# Patient Record
Sex: FEMALE | Race: WHITE | Employment: FULL TIME | URBAN - METROPOLITAN AREA
[De-identification: names, ages, dates, MRNs, and addresses within clinical notes are randomized per-mention and may not be internally consistent; named-entity substitution may affect disease eponyms.]

---

## 2017-06-08 ENCOUNTER — OFFICE VISIT (OUTPATIENT)
Dept: OBGYN CLINIC | Age: 52
End: 2017-06-08
Payer: COMMERCIAL

## 2017-06-08 VITALS
HEIGHT: 66 IN | BODY MASS INDEX: 38.31 KG/M2 | DIASTOLIC BLOOD PRESSURE: 64 MMHG | SYSTOLIC BLOOD PRESSURE: 108 MMHG | WEIGHT: 238.4 LBS

## 2017-06-08 DIAGNOSIS — Z12.39 BREAST CANCER SCREENING: Primary | ICD-10-CM

## 2017-06-08 PROCEDURE — 99396 PREV VISIT EST AGE 40-64: CPT | Performed by: NURSE PRACTITIONER

## 2017-06-08 RX ORDER — LANOLIN ALCOHOL/MO/W.PET/CERES
1000 CREAM (GRAM) TOPICAL DAILY
COMMUNITY
End: 2017-12-27

## 2017-06-08 RX ORDER — GLUCOSAMINE/CHONDR SU A SOD 750-600 MG
TABLET ORAL
COMMUNITY
End: 2017-12-27

## 2017-06-08 RX ORDER — MULTIVIT-MIN/IRON/FOLIC ACID/K 18-600-40
CAPSULE ORAL
COMMUNITY
End: 2020-05-20

## 2017-06-08 ASSESSMENT — ENCOUNTER SYMPTOMS
BACK PAIN: 0
DIARRHEA: 0
CONSTIPATION: 0
ABDOMINAL PAIN: 0
SHORTNESS OF BREATH: 0
COUGH: 0
ABDOMINAL DISTENTION: 0

## 2017-06-28 DIAGNOSIS — Z12.39 BREAST CANCER SCREENING: ICD-10-CM

## 2017-12-27 ENCOUNTER — HOSPITAL ENCOUNTER (OUTPATIENT)
Age: 52
Setting detail: SPECIMEN
Discharge: HOME | End: 2017-12-27
Payer: COMMERCIAL

## 2017-12-27 ENCOUNTER — OFFICE VISIT (OUTPATIENT)
Dept: OBGYN CLINIC | Age: 52
End: 2017-12-27
Payer: COMMERCIAL

## 2017-12-27 VITALS
HEIGHT: 66 IN | DIASTOLIC BLOOD PRESSURE: 84 MMHG | WEIGHT: 240 LBS | BODY MASS INDEX: 38.57 KG/M2 | SYSTOLIC BLOOD PRESSURE: 130 MMHG

## 2017-12-27 DIAGNOSIS — N89.8 VAGINAL DISCHARGE: ICD-10-CM

## 2017-12-27 DIAGNOSIS — N30.00 ACUTE CYSTITIS WITHOUT HEMATURIA: ICD-10-CM

## 2017-12-27 DIAGNOSIS — N30.00 ACUTE CYSTITIS WITHOUT HEMATURIA: Primary | ICD-10-CM

## 2017-12-27 LAB
BILIRUBIN URINE: NEGATIVE
COLOR: YELLOW
COMMENT UA: NORMAL
DIRECT EXAM: NORMAL
GLUCOSE URINE: NEGATIVE
KETONES, URINE: NEGATIVE
LEUKOCYTE ESTERASE, URINE: NEGATIVE
Lab: NORMAL
NITRITE, URINE: NEGATIVE
PH UA: 7.5 (ref 5–8)
PROTEIN UA: NEGATIVE
SPECIFIC GRAVITY UA: 1.01 (ref 1–1.03)
SPECIMEN DESCRIPTION: NORMAL
STATUS: NORMAL
TURBIDITY: CLEAR
URINE HGB: NEGATIVE
UROBILINOGEN, URINE: NORMAL

## 2017-12-27 PROCEDURE — G8427 DOCREV CUR MEDS BY ELIG CLIN: HCPCS | Performed by: OBSTETRICS & GYNECOLOGY

## 2017-12-27 PROCEDURE — 99213 OFFICE O/P EST LOW 20 MIN: CPT | Performed by: OBSTETRICS & GYNECOLOGY

## 2017-12-27 PROCEDURE — 3017F COLORECTAL CA SCREEN DOC REV: CPT | Performed by: OBSTETRICS & GYNECOLOGY

## 2017-12-27 PROCEDURE — G8484 FLU IMMUNIZE NO ADMIN: HCPCS | Performed by: OBSTETRICS & GYNECOLOGY

## 2017-12-27 PROCEDURE — 1036F TOBACCO NON-USER: CPT | Performed by: OBSTETRICS & GYNECOLOGY

## 2017-12-27 PROCEDURE — G8417 CALC BMI ABV UP PARAM F/U: HCPCS | Performed by: OBSTETRICS & GYNECOLOGY

## 2017-12-28 ENCOUNTER — TELEPHONE (OUTPATIENT)
Dept: OBGYN CLINIC | Age: 52
End: 2017-12-28

## 2017-12-28 NOTE — TELEPHONE ENCOUNTER
patient calling for results of U/A and vaginal probe from yesterday . Advised her all tests were negative.  She wonders if Dr Sarahi Orellana has any recommendations as \" she feels different down there\" \" tight\"     Patient number [phone]

## 2017-12-28 NOTE — TELEPHONE ENCOUNTER
I can only suggest that she stopped using the sent pad she's been using and try a lubricant such as Replens

## 2018-02-19 ENCOUNTER — OFFICE VISIT (OUTPATIENT)
Dept: OBGYN CLINIC | Age: 53
End: 2018-02-19
Payer: COMMERCIAL

## 2018-02-19 VITALS
DIASTOLIC BLOOD PRESSURE: 86 MMHG | HEIGHT: 66 IN | WEIGHT: 239.6 LBS | BODY MASS INDEX: 38.51 KG/M2 | SYSTOLIC BLOOD PRESSURE: 122 MMHG

## 2018-02-19 DIAGNOSIS — N94.10 DYSPAREUNIA, FEMALE: Primary | ICD-10-CM

## 2018-02-19 PROCEDURE — 1036F TOBACCO NON-USER: CPT | Performed by: NURSE PRACTITIONER

## 2018-02-19 PROCEDURE — G8427 DOCREV CUR MEDS BY ELIG CLIN: HCPCS | Performed by: NURSE PRACTITIONER

## 2018-02-19 PROCEDURE — G8484 FLU IMMUNIZE NO ADMIN: HCPCS | Performed by: NURSE PRACTITIONER

## 2018-02-19 PROCEDURE — 99213 OFFICE O/P EST LOW 20 MIN: CPT | Performed by: NURSE PRACTITIONER

## 2018-02-19 PROCEDURE — 3017F COLORECTAL CA SCREEN DOC REV: CPT | Performed by: NURSE PRACTITIONER

## 2018-02-19 PROCEDURE — G8417 CALC BMI ABV UP PARAM F/U: HCPCS | Performed by: NURSE PRACTITIONER

## 2018-02-19 RX ORDER — LISINOPRIL AND HYDROCHLOROTHIAZIDE 12.5; 1 MG/1; MG/1
TABLET ORAL
Refills: 0 | COMMUNITY
Start: 2018-02-14

## 2018-02-19 NOTE — PROGRESS NOTES
Bilirubin Urine NEGATIVE NEG    Ketones, Urine NEGATIVE NEG    Specific Gravity, UA 1.015 1.005 - 1.030    Urine Hgb NEGATIVE NEG    pH, UA 7.5 5.0 - 8.0    Protein, UA NEGATIVE NEG    Urobilinogen, Urine Normal NORM    Nitrite, Urine NEGATIVE NEG    Leukocyte Esterase, Urine NEGATIVE NEG    Urinalysis Comments       Microscopic exam not performed based on chemical results unless requested in   VAGINITIS DNA PROBE    Collection Time: 12/27/17  5:22 PM   Result Value Ref Range    Specimen Description . VAGINA     Special Requests NOT REPORTED     Direct Exam NEGATIVE for Candida sp. Direct Exam NEGATIVE for Gardnerella vaginalis     Direct Exam NEGATIVE for Trichomonas vaginalis     Direct Exam       Method of testing is a DNA probe intended for detection and identification of    Direct Exam        Candida species, Gardnerella vaginalis, and Trichomonas vaginalis nucleic acid    Direct Exam        in vaginal fluid specimens from patients with symptoms of vaginitis/vaginosis.     Direct Exam       [facility] [address] [address] [phone]    Status FINAL 12/27/2017        P-  Pelvic US  RTO 2 months

## 2018-02-27 ENCOUNTER — PROCEDURE VISIT (OUTPATIENT)
Dept: OBGYN CLINIC | Age: 53
End: 2018-02-27
Payer: COMMERCIAL

## 2018-02-27 DIAGNOSIS — N94.10 DYSPAREUNIA, FEMALE: ICD-10-CM

## 2018-02-27 PROCEDURE — 76830 TRANSVAGINAL US NON-OB: CPT | Performed by: OBSTETRICS & GYNECOLOGY

## 2018-02-27 PROCEDURE — 76856 US EXAM PELVIC COMPLETE: CPT | Performed by: OBSTETRICS & GYNECOLOGY

## 2019-01-31 ENCOUNTER — PROCEDURE VISIT (OUTPATIENT)
Dept: OBGYN CLINIC | Age: 54
End: 2019-01-31
Payer: COMMERCIAL

## 2019-01-31 VITALS
BODY MASS INDEX: 40.29 KG/M2 | OXYGEN SATURATION: 97 % | WEIGHT: 241.8 LBS | SYSTOLIC BLOOD PRESSURE: 136 MMHG | DIASTOLIC BLOOD PRESSURE: 88 MMHG | HEIGHT: 65 IN | HEART RATE: 70 BPM | RESPIRATION RATE: 20 BRPM

## 2019-01-31 DIAGNOSIS — Z30.432 ENCOUNTER FOR IUD REMOVAL: Primary | ICD-10-CM

## 2019-01-31 DIAGNOSIS — Z97.5 ATTEMPTED IUD REMOVAL, UNSUCCESSFUL: ICD-10-CM

## 2019-01-31 DIAGNOSIS — Z53.8 ATTEMPTED IUD REMOVAL, UNSUCCESSFUL: ICD-10-CM

## 2019-01-31 DIAGNOSIS — N91.2 AMENORRHEA: ICD-10-CM

## 2019-01-31 PROCEDURE — 58301 REMOVE INTRAUTERINE DEVICE: CPT | Performed by: NURSE PRACTITIONER

## 2019-01-31 RX ORDER — TRIAMCINOLONE ACETONIDE 1 MG/G
CREAM TOPICAL
COMMUNITY
Start: 2017-05-22

## 2019-01-31 RX ORDER — CLOBETASOL PROPIONATE 0.5 MG/G
OINTMENT TOPICAL
Refills: 0 | COMMUNITY
Start: 2018-12-10 | End: 2019-04-03

## 2019-02-07 ENCOUNTER — HOSPITAL ENCOUNTER (OUTPATIENT)
Age: 54
Setting detail: SPECIMEN
Discharge: HOME | End: 2019-02-07
Payer: COMMERCIAL

## 2019-02-07 ENCOUNTER — OFFICE VISIT (OUTPATIENT)
Dept: OBGYN CLINIC | Age: 54
End: 2019-02-07
Payer: COMMERCIAL

## 2019-02-07 VITALS
WEIGHT: 243.6 LBS | DIASTOLIC BLOOD PRESSURE: 86 MMHG | BODY MASS INDEX: 39.15 KG/M2 | SYSTOLIC BLOOD PRESSURE: 128 MMHG | HEIGHT: 66 IN

## 2019-02-07 DIAGNOSIS — Z01.419 ENCOUNTER FOR GYNECOLOGICAL EXAMINATION: Primary | ICD-10-CM

## 2019-02-07 DIAGNOSIS — Z12.31 ENCOUNTER FOR SCREENING MAMMOGRAM FOR BREAST CANCER: ICD-10-CM

## 2019-02-07 PROCEDURE — G8484 FLU IMMUNIZE NO ADMIN: HCPCS | Performed by: NURSE PRACTITIONER

## 2019-02-07 PROCEDURE — 99396 PREV VISIT EST AGE 40-64: CPT | Performed by: NURSE PRACTITIONER

## 2019-02-07 ASSESSMENT — ENCOUNTER SYMPTOMS
CONSTIPATION: 0
COUGH: 0
ABDOMINAL PAIN: 0
ABDOMINAL DISTENTION: 0
SHORTNESS OF BREATH: 0
DIARRHEA: 0
BACK PAIN: 0

## 2019-02-21 LAB — CYTOLOGY REPORT: NORMAL

## 2019-02-26 ENCOUNTER — TELEPHONE (OUTPATIENT)
Dept: OBGYN CLINIC | Age: 54
End: 2019-02-26

## 2019-02-26 ENCOUNTER — PROCEDURE VISIT (OUTPATIENT)
Dept: OBGYN CLINIC | Age: 54
End: 2019-02-26
Payer: COMMERCIAL

## 2019-02-26 ENCOUNTER — HOSPITAL ENCOUNTER (OUTPATIENT)
Age: 54
Setting detail: SPECIMEN
Discharge: HOME | End: 2019-02-26
Payer: COMMERCIAL

## 2019-02-26 DIAGNOSIS — Z30.432 ENCOUNTER FOR IUD REMOVAL: ICD-10-CM

## 2019-02-26 DIAGNOSIS — Z53.8 ATTEMPTED IUD REMOVAL, UNSUCCESSFUL: ICD-10-CM

## 2019-02-26 DIAGNOSIS — N91.2 AMENORRHEA: ICD-10-CM

## 2019-02-26 DIAGNOSIS — T83.39XD OTHER MECHANICAL COMPLICATION OF INTRAUTERINE CONTRACEPTIVE DEVICE, SUBSEQUENT ENCOUNTER: Primary | ICD-10-CM

## 2019-02-26 DIAGNOSIS — Z97.5 ATTEMPTED IUD REMOVAL, UNSUCCESSFUL: ICD-10-CM

## 2019-02-26 PROCEDURE — 76857 US EXAM PELVIC LIMITED: CPT | Performed by: OBSTETRICS & GYNECOLOGY

## 2019-02-26 PROCEDURE — 58301 REMOVE INTRAUTERINE DEVICE: CPT | Performed by: OBSTETRICS & GYNECOLOGY

## 2019-02-27 ENCOUNTER — TELEPHONE (OUTPATIENT)
Dept: OBGYN CLINIC | Age: 54
End: 2019-02-27

## 2019-02-27 LAB
ESTRADIOL LEVEL: 69 PG/ML (ref 27–314)
FOLLICLE STIMULATING HORMONE: 14.1 U/L (ref 1.7–21.5)

## 2019-02-28 RX ORDER — NORETHINDRONE ACETATE AND ETHINYL ESTRADIOL 1MG-20(21)
1 KIT ORAL DAILY
Qty: 1 PACKET | Refills: 12 | Status: SHIPPED | OUTPATIENT
Start: 2019-02-28 | End: 2019-03-01 | Stop reason: SDUPTHER

## 2019-03-01 RX ORDER — NORETHINDRONE ACETATE AND ETHINYL ESTRADIOL 1MG-20(21)
1 KIT ORAL DAILY
Qty: 1 PACKET | Refills: 12 | Status: SHIPPED | OUTPATIENT
Start: 2019-03-01 | End: 2019-04-03

## 2019-03-07 ENCOUNTER — TELEPHONE (OUTPATIENT)
Dept: OBGYN CLINIC | Age: 54
End: 2019-03-07

## 2019-03-07 RX ORDER — MEDROXYPROGESTERONE ACETATE 150 MG/ML
150 INJECTION, SUSPENSION INTRAMUSCULAR
Qty: 1 ML | Refills: 3
Start: 2019-03-07 | End: 2019-04-03

## 2019-04-03 ENCOUNTER — OFFICE VISIT (OUTPATIENT)
Dept: OBGYN CLINIC | Age: 54
End: 2019-04-03
Payer: COMMERCIAL

## 2019-04-03 VITALS
WEIGHT: 241.6 LBS | SYSTOLIC BLOOD PRESSURE: 136 MMHG | DIASTOLIC BLOOD PRESSURE: 80 MMHG | HEIGHT: 66 IN | BODY MASS INDEX: 38.83 KG/M2

## 2019-04-03 DIAGNOSIS — Z30.09 ENCOUNTER FOR OTHER GENERAL COUNSELING OR ADVICE ON CONTRACEPTION: Primary | ICD-10-CM

## 2019-04-03 PROCEDURE — 99401 PREV MED CNSL INDIV APPRX 15: CPT | Performed by: NURSE PRACTITIONER

## 2019-04-03 PROCEDURE — 1036F TOBACCO NON-USER: CPT | Performed by: NURSE PRACTITIONER

## 2019-04-03 PROCEDURE — G8427 DOCREV CUR MEDS BY ELIG CLIN: HCPCS | Performed by: NURSE PRACTITIONER

## 2019-04-03 PROCEDURE — 3017F COLORECTAL CA SCREEN DOC REV: CPT | Performed by: NURSE PRACTITIONER

## 2019-04-03 PROCEDURE — G8417 CALC BMI ABV UP PARAM F/U: HCPCS | Performed by: NURSE PRACTITIONER

## 2019-04-03 PROCEDURE — 3014F SCREEN MAMMO DOC REV: CPT | Performed by: NURSE PRACTITIONER

## 2019-04-03 SDOH — HEALTH STABILITY: MENTAL HEALTH: HOW MANY STANDARD DRINKS CONTAINING ALCOHOL DO YOU HAVE ON A TYPICAL DAY?: 3 OR 4

## 2019-04-03 SDOH — HEALTH STABILITY: MENTAL HEALTH: HOW OFTEN DO YOU HAVE A DRINK CONTAINING ALCOHOL?: 2-3 TIMES A WEEK

## 2019-04-03 ASSESSMENT — ENCOUNTER SYMPTOMS
SHORTNESS OF BREATH: 0
ABDOMINAL PAIN: 0
ABDOMINAL DISTENTION: 0
COUGH: 0
CONSTIPATION: 0
BACK PAIN: 0
DIARRHEA: 0

## 2019-04-03 NOTE — PROGRESS NOTES
HPI:      Gemini Diaz is a 48 y.o. female who presents today for:   Chief Complaint   Patient presents with    Consultation     nell birth control        HPI- recently had IUD removed. [address] wnl- not menopausal.  Discussed multiple options for contraception- does not want to do pills or NuvaRing and hesitant about Depo. Would like to use another IUD because she doesn't want to \"worry\" about anything. Reviewed risks and benefits. Is on cycle now. ContraceptionCounseling  Patient presents for contraception counseling. The patient has no complaints today. The patient is sexually active. Pertinent past medicalhistory: none. Current Outpatient Medications   Medication Sig Dispense Refill    triamcinolone (KENALOG) 0.1 % cream apply to affected area twice a day for 2 weeks PER MONTH      lisinopril-hydrochlorothiazide (PRINZIDE;ZESTORETIC) 10-12.5 MG per tablet take 1 tablet by mouth once daily  0    Fexofenadine HCl (ALLEGRA PO) Take 10 mg by mouth daily      Esomeprazole Magnesium (NEXIUM 24HR PO) Take by mouth      Cholecalciferol (VITAMIN D) 2000 UNITS CAPS capsule Take by mouth      Lysine 500 MG TABS Take by mouth      CHONDROITIN SULFATE PO Take 800 mg by mouth daily       No current facility-administered medications for this visit.       Allergies   Allergen Reactions    Imitrex [Sumatriptan] Anaphylaxis    Percocet [Oxycodone-Acetaminophen] Nausea And Vomiting          Past Medical History:   Diagnosis Date    Degenerative disc disease, thoracic     Endometriosis     Herpes simplex virus (HSV) infection     Hypertension     Osteoarthritis       Past Surgical History:   Procedure Laterality Date     SECTION, LOW TRANSVERSE       SECTION, LOW TRANSVERSE      INTRAUTERINE DEVICE INSERTION  2013    Mirena    LAPAROSCOPY  1996    endometriosis    SINUS SURGERY  2005     Family History   Problem Relation Age of Onset    Rheum Arthritis Mother     Prostate Cancer Father 79    Heart Attack Father 76    No Known Problems Sister     No Known Problems Sister      Social History     Tobacco Use    Smoking status: Never Smoker    Smokeless tobacco: Never Used   Substance Use Topics    Alcohol use: Yes     Frequency: 2-3 times a week     Drinks per session: 3 or 4     Binge frequency: Never        Subjective:    Review of Systems   Constitutional: Negative for appetite change and fatigue. HENT: Negative for congestion and hearing loss. Eyes: Negative for visual disturbance. Respiratory: Negative for cough and shortness of breath. Cardiovascular: Negative for chest pain and palpitations. Gastrointestinal: Negative for abdominal distention, abdominal pain, constipation and diarrhea. Genitourinary: Negative for flank pain, frequency, menstrual problem, pelvic pain and vaginal discharge. Musculoskeletal: Negative for back pain. Neurological: Negative for syncope and headaches. Psychiatric/Behavioral: Negative for behavioral problems. Objective:   Heart: Regular rate and rhythym, nomurmur    Lungs: clear to auscultation    Abdomen: soft, nontenderno masses or organomegaly detected, BS X 4 quadrants  /80   Wt 241 lb 9.6 oz (109.6 kg)   LMP 03/27/2019   Breastfeeding? No   BMI 39.59 kg/m²     Assessment:    Physical Exam   Diagnosis Orders   1. Encounter for other general counseling or advice on contraception       Patient is a 48 y.o. [id number]  seen with total face to face timeof 15 minutes. More than 50% of this visit was on counseling and education regardingher    Diagnosis Orders   1. Encounter for other general counseling or advice on contraception      and her options. She was also counseled on her preventative health maintenancerecommendations and follow-up of IUD insertion. Refer to plan and assessment.           Plan:   Schedule for IUD  Meds sent to pharmacy    Electronically signed by Karin Lea on 4/3/2019

## 2019-04-08 ENCOUNTER — PROCEDURE VISIT (OUTPATIENT)
Dept: OBGYN CLINIC | Age: 54
End: 2019-04-08
Payer: COMMERCIAL

## 2019-04-08 ENCOUNTER — HOSPITAL ENCOUNTER (OUTPATIENT)
Age: 54
Setting detail: SPECIMEN
Discharge: HOME | End: 2019-04-08
Payer: COMMERCIAL

## 2019-04-08 VITALS
HEIGHT: 66 IN | SYSTOLIC BLOOD PRESSURE: 128 MMHG | BODY MASS INDEX: 38.44 KG/M2 | WEIGHT: 239.2 LBS | DIASTOLIC BLOOD PRESSURE: 86 MMHG

## 2019-04-08 DIAGNOSIS — Z11.3 ROUTINE SCREENING FOR STI (SEXUALLY TRANSMITTED INFECTION): ICD-10-CM

## 2019-04-08 DIAGNOSIS — Z30.019 ENCOUNTER FOR FEMALE BIRTH CONTROL: Primary | ICD-10-CM

## 2019-04-08 DIAGNOSIS — Z30.430 ENCOUNTER FOR IUD INSERTION: ICD-10-CM

## 2019-04-08 PROCEDURE — 58300 INSERT INTRAUTERINE DEVICE: CPT | Performed by: NURSE PRACTITIONER

## 2019-04-08 NOTE — PROGRESS NOTES
INSERTION OF IUD    4/8/2019  Author Joseph  Patient's last menstrual period was 03/27/2019.  48 y.o. Social History     Tobacco Use   Smoking Status Never Smoker   Smokeless Tobacco Never Used         Patient desires insertion of an IUD. She has reviewed the handouts, and all questions have been answered. Risks and benefits discussed. Patient verbalizes understanding. She is noton her menses. The patient was positioned in dorsal lithotomy position on the exam table. A speculum was inserted and the cervix was cleansed with betadine. A single tooth tenaculum was needed to stabilize the cervix The uterus sounds to8. The IUD  was used to insert the device under sterile technique without difficulty. The strings were cut to 7 cm. The patient tolerated the procedure well. She will return in 1 month for follow up.     Lot Number: [id number]  Expiration: 3/2021

## 2019-04-10 LAB
C TRACH DNA GENITAL QL NAA+PROBE: NEGATIVE
N. GONORRHOEAE DNA: NEGATIVE
SPECIMEN DESCRIPTION: NORMAL

## 2019-05-13 ENCOUNTER — OFFICE VISIT (OUTPATIENT)
Dept: OBGYN CLINIC | Age: 54
End: 2019-05-13

## 2019-05-13 VITALS
BODY MASS INDEX: 38.8 KG/M2 | HEIGHT: 66 IN | WEIGHT: 241.4 LBS | SYSTOLIC BLOOD PRESSURE: 126 MMHG | DIASTOLIC BLOOD PRESSURE: 92 MMHG

## 2019-05-13 DIAGNOSIS — Z97.5 IUD (INTRAUTERINE DEVICE) IN PLACE: Primary | ICD-10-CM

## 2019-05-13 PROCEDURE — 1036F TOBACCO NON-USER: CPT | Performed by: NURSE PRACTITIONER

## 2019-05-13 PROCEDURE — G8417 CALC BMI ABV UP PARAM F/U: HCPCS | Performed by: NURSE PRACTITIONER

## 2019-05-13 PROCEDURE — 3017F COLORECTAL CA SCREEN DOC REV: CPT | Performed by: NURSE PRACTITIONER

## 2019-05-13 PROCEDURE — 3014F SCREEN MAMMO DOC REV: CPT | Performed by: NURSE PRACTITIONER

## 2019-05-13 PROCEDURE — 99024 POSTOP FOLLOW-UP VISIT: CPT | Performed by: NURSE PRACTITIONER

## 2019-05-13 PROCEDURE — G8427 DOCREV CUR MEDS BY ELIG CLIN: HCPCS | Performed by: NURSE PRACTITIONER

## 2019-05-13 ASSESSMENT — ENCOUNTER SYMPTOMS
ABDOMINAL DISTENTION: 0
CONSTIPATION: 0
DIARRHEA: 0
BACK PAIN: 0
COUGH: 0
ABDOMINAL PAIN: 0
SHORTNESS OF BREATH: 0

## 2019-05-13 NOTE — PROGRESS NOTES
Subjective:      Patient ID: Juan R Engle is being seen today for   Chief Complaint   Patient presents with    Follow Up After Procedure     Mirena placed 4/8/19       HPI  Here for FU of Mirena placed on 4.8. 18. Denies bleeding, pain. Has resumed intercourse without any issues. No concerns at this time  Review of Systems   Constitutional: Negative for appetite change and fatigue. HENT: Negative for congestion and hearing loss. Eyes: Negative for visual disturbance. Respiratory: Negative for cough and shortness of breath. Cardiovascular: Negative for chest pain and palpitations. Gastrointestinal: Negative for abdominal distention, abdominal pain, constipation and diarrhea. Genitourinary: Negative for flank pain, frequency, menstrual problem, pelvic pain and vaginal discharge. Musculoskeletal: Negative for back pain. Neurological: Negative for syncope and headaches. Psychiatric/Behavioral: Negative for behavioral problems.        Vitals:    05/13/19 1557   BP: (!) 136/94   Site: Right Upper Arm   Position: Sitting   Cuff Size: Large Adult   Weight: 241 lb 6.4 oz (109.5 kg)   Height: 5' 5.5\" (1.664 m)     Current Outpatient Medications   Medication Sig Dispense Refill    triamcinolone (KENALOG) 0.1 % cream apply to affected area twice a day for 2 weeks PER MONTH      lisinopril-hydrochlorothiazide (PRINZIDE;ZESTORETIC) 10-12.5 MG per tablet take 1 tablet by mouth once daily  0    Fexofenadine HCl (ALLEGRA PO) Take 10 mg by mouth daily      Esomeprazole Magnesium (NEXIUM 24HR PO) Take by mouth      Cholecalciferol (VITAMIN D) 2000 UNITS CAPS capsule Take by mouth      Lysine 500 MG TABS Take by mouth      CHONDROITIN SULFATE PO Take 800 mg by mouth daily       Current Facility-Administered Medications   Medication Dose Route Frequency Provider Last Rate Last Dose    levonorgestrel (MIRENA) IUD 52 mg 1 each  1 each Intrauterine Continuous Frutoso Latter-day, APRN - CNP   1 each at 04/08/19 2999 Allergies   Allergen Reactions    Imitrex [Sumatriptan] Anaphylaxis    Percocet [Oxycodone-Acetaminophen] Nausea And Vomiting       Objective:   Physical Exam   Constitutional: She is oriented to person, place, and time. She appears well-developed and well-nourished. Genitourinary: There is no rash, tenderness, lesion or injury on the right labia. There is no rash, tenderness, lesion or injury on the left labia. Cervix does not exhibit motion tenderness, lesion, discharge or friability. Uterus is not enlarged, tender or irregular (is regular). HENT:   Head: Normocephalic and atraumatic. Eyes: Conjunctivae and EOM are normal.   Neck: Normal range of motion. Neck supple. Cardiovascular: Normal rate and regular rhythm. Pulmonary/Chest: Effort normal and breath sounds normal.   Abdominal: Soft. Bowel sounds are normal.   Neurological: She is alert and oriented to person, place, and time. Skin: Skin is warm and dry. Psychiatric: She has a normal mood and affect. Her behavior is normal. Judgment and thought content normal.       Assessment:      No diagnosis found. Plan:    RTO annual exam and prn  Return in about 9 months (around 2/13/2020).         Narendra Morrison, APRN - CNP

## 2020-05-20 ENCOUNTER — HOSPITAL ENCOUNTER (OUTPATIENT)
Age: 55
Setting detail: SPECIMEN
Discharge: HOME | End: 2020-05-20
Payer: COMMERCIAL

## 2020-05-20 ENCOUNTER — OFFICE VISIT (OUTPATIENT)
Dept: OBGYN CLINIC | Age: 55
End: 2020-05-20
Payer: COMMERCIAL

## 2020-05-20 VITALS
WEIGHT: 246 LBS | HEIGHT: 66 IN | BODY MASS INDEX: 39.53 KG/M2 | DIASTOLIC BLOOD PRESSURE: 82 MMHG | SYSTOLIC BLOOD PRESSURE: 126 MMHG

## 2020-05-20 PROCEDURE — 99396 PREV VISIT EST AGE 40-64: CPT | Performed by: NURSE PRACTITIONER

## 2020-05-20 RX ORDER — FLUCONAZOLE 150 MG/1
150 TABLET ORAL EVERY OTHER DAY
Qty: 2 TABLET | Refills: 0 | Status: SHIPPED | OUTPATIENT
Start: 2020-05-20 | End: 2020-05-23

## 2020-05-20 RX ORDER — NYSTATIN 100000 [USP'U]/G
POWDER TOPICAL
Qty: 2 BOTTLE | Refills: 1 | Status: SHIPPED | OUTPATIENT
Start: 2020-05-20 | End: 2021-07-14

## 2020-05-20 ASSESSMENT — ENCOUNTER SYMPTOMS
ABDOMINAL DISTENTION: 0
COUGH: 0
SHORTNESS OF BREATH: 0
DIARRHEA: 0
BACK PAIN: 0
CONSTIPATION: 0
ABDOMINAL PAIN: 0

## 2020-05-20 NOTE — PROGRESS NOTES
Breath sounds: Normal breath sounds. Chest:      Breasts: Breasts are symmetrical.         Right: No inverted nipple. Left: No inverted nipple, mass, nipple discharge, skin change or tenderness. Abdominal:      General: Bowel sounds are normal. There is no distension. Palpations: Abdomen is soft. There is no mass. Tenderness: There is no abdominal tenderness. Hernia: There is no hernia in the right inguinal area or left inguinal area. Genitourinary:     Labia:         Right: No rash or lesion. Left: No rash or lesion. Vagina: No vaginal discharge or tenderness. Cervix: No cervical motion tenderness, discharge or friability. Uterus: Not deviated, not enlarged and not fixed. Adnexa:         Right: No mass, tenderness or fullness. Left: No mass, tenderness or fullness. Musculoskeletal:         General: No tenderness. Lymphadenopathy:      Cervical: No cervical adenopathy. Skin:     General: Skin is warm and dry. Neurological:      Mental Status: She is alert and oriented to person, place, and time. Psychiatric:         Behavior: Behavior normal.         Thought Content: Thought content normal.         Judgment: Judgment normal.           Assessment:     1. Encounter for gynecological examination    2. Encounter for screening mammogram for breast cancer          Plan:   1. Pap collected. Discussed new pap smear guidelines. Desires re-pap in 1 year. 2. Screening mammogram discussed and advised yearly if within normal limits. 3. Calcium and Vitamin D dosing reviewed. 4. Colonoscopy screening reviewed. 5. Bone density testing reviewed.    6. Diflucan 150mg qod x 2 doses  Nystatin powder tid to affected areas tid x 7 days  Call if not improved within 14 days  Electronicallysigned by Daisy Fonseca on 5/20/2020

## 2020-05-28 LAB — CYTOLOGY REPORT: NORMAL

## 2021-06-30 ENCOUNTER — TELEPHONE (OUTPATIENT)
Dept: OBGYN CLINIC | Age: 56
End: 2021-06-30

## 2021-06-30 DIAGNOSIS — Z12.31 SCREENING MAMMOGRAM, ENCOUNTER FOR: Primary | ICD-10-CM

## 2021-07-14 ENCOUNTER — HOSPITAL ENCOUNTER (OUTPATIENT)
Age: 56
Setting detail: SPECIMEN
Discharge: HOME | End: 2021-07-14
Payer: COMMERCIAL

## 2021-07-14 ENCOUNTER — OFFICE VISIT (OUTPATIENT)
Dept: OBGYN CLINIC | Age: 56
End: 2021-07-14
Payer: COMMERCIAL

## 2021-07-14 VITALS
DIASTOLIC BLOOD PRESSURE: 76 MMHG | BODY MASS INDEX: 38.32 KG/M2 | WEIGHT: 230 LBS | SYSTOLIC BLOOD PRESSURE: 130 MMHG | HEIGHT: 65 IN

## 2021-07-14 DIAGNOSIS — Z12.31 ENCOUNTER FOR SCREENING MAMMOGRAM FOR BREAST CANCER: ICD-10-CM

## 2021-07-14 DIAGNOSIS — Z01.419 WOMEN'S ANNUAL ROUTINE GYNECOLOGICAL EXAMINATION: Primary | ICD-10-CM

## 2021-07-14 DIAGNOSIS — Z30.431 IUD CHECK UP: ICD-10-CM

## 2021-07-14 PROCEDURE — 99396 PREV VISIT EST AGE 40-64: CPT | Performed by: NURSE PRACTITIONER

## 2021-07-14 RX ORDER — NAPROXEN 500 MG/1
TABLET ORAL
COMMUNITY

## 2021-07-14 RX ORDER — GUAIFENESIN, PSEUDOEPHEDRINE HYDROCHLORIDE 600; 60 MG/1; MG/1
1 TABLET, EXTENDED RELEASE ORAL PRN
COMMUNITY
End: 2021-07-14 | Stop reason: ALTCHOICE

## 2021-07-14 RX ORDER — ASPIRIN 81 MG/1
TABLET, COATED ORAL
COMMUNITY
Start: 2021-05-06 | End: 2022-01-05

## 2021-07-14 RX ORDER — DICLOFENAC SODIUM 75 MG/1
TABLET, DELAYED RELEASE ORAL
COMMUNITY
Start: 2021-06-23

## 2021-07-14 RX ORDER — CYCLOBENZAPRINE HCL 5 MG
TABLET ORAL
COMMUNITY

## 2021-07-14 RX ORDER — PANTOPRAZOLE SODIUM 40 MG/1
40 TABLET, DELAYED RELEASE ORAL DAILY
COMMUNITY
End: 2022-01-05

## 2021-07-14 RX ORDER — GABAPENTIN 100 MG/1
200 CAPSULE ORAL NIGHTLY
COMMUNITY
Start: 2020-12-11

## 2021-07-14 RX ORDER — VALACYCLOVIR HYDROCHLORIDE 1 G/1
1000 TABLET, FILM COATED ORAL 2 TIMES DAILY PRN
COMMUNITY

## 2021-07-14 ASSESSMENT — ENCOUNTER SYMPTOMS
DIARRHEA: 0
SHORTNESS OF BREATH: 0
COUGH: 0
BACK PAIN: 0
CONSTIPATION: 0
ABDOMINAL DISTENTION: 0
ABDOMINAL PAIN: 0

## 2021-07-14 NOTE — PROGRESS NOTES
HPI:     Gosia Lynn a 50 y.o. female who presents today for:No chief complaint on file. HPI  Here for annual exam.. 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 Has 2 children- 31/26. Has a 7 y/o granddaughter. Had a knee replaced 5 weeks ago and doing well. Has been following PT for her knee and trying to stay active. Working on eating healthy. Not having any menopausal symptoms- will draw labs next year. Had IUD for menorrhagia and would like to keep in until next year- doesn't want taken out until she knows she is menopausal.. May call for labwork 1-2 weeks prior to appt. GYNECOLOGICHISTORY:  MenstrualHistory: No LMP recorded. Patient has had an implant. Sexually Transmitted Infections: No  History of Ectopic Pregnancy:No  Menarche: 17  Denies VB  Sexually active: yes  Dyspareunia: none    Current Outpatient Medications   Medication Sig Dispense Refill    Multiple Vitamin (MULTIVITAMIN PO) Take by mouth      nystatin (MYCOSTATIN) 856692 UNIT/GM powder Apply 3 times daily.  2 Bottle 1    triamcinolone (KENALOG) 0.1 % cream apply to affected area twice a day for 2 weeks PER MONTH      lisinopril-hydrochlorothiazide (PRINZIDE;ZESTORETIC) 10-12.5 MG per tablet take 1 tablet by mouth once daily  0    Fexofenadine HCl (ALLEGRA PO) Take 10 mg by mouth daily      Esomeprazole Magnesium (NEXIUM 24HR PO) Take by mouth      Lysine 500 MG TABS Take by mouth      CHONDROITIN SULFATE PO Take 800 mg by mouth daily       Current Facility-Administered Medications   Medication Dose Route Frequency Provider Last Rate Last Admin    levonorgestrel (MIRENA) IUD 52 mg 1 each  1 each Intrauterine Continuous Leslie Almonte, APRN - CNP   1 each at 04/08/19 1618     Allergies   Allergen Reactions    Imitrex [Sumatriptan] Anaphylaxis    Other     Nickel Rash     Severe itching, rash, hives  Severe itching, rash, hives      Percocet [Oxycodone-Acetaminophen] Nausea And Vomiting       Past Medical History:   Diagnosis Date  Degenerative disc disease, thoracic     Endometriosis     Herpes simplex virus (HSV) infection     Hypertension     Nonerosive lichen planus of oral mucosa 2019    Osteoarthritis      Denies family history of breast, ovarian, uterus, colon CA     Past Surgical History:   Procedure Laterality Date     SECTION, LOW TRANSVERSE       SECTION, LOW TRANSVERSE      INTRAUTERINE DEVICE INSERTION  2013    Mirena    INTRAUTERINE DEVICE INSERTION  2019    Mirena     INTRAUTERINE DEVICE REMOVAL  2019    Ultrasound used     LAPAROSCOPY  1996    endometriosis    SINUS SURGERY  2005     Family History   Problem Relation Age of Onset    Rheum Arthritis Mother     Prostate Cancer Father 79    Heart Attack Father 76    No Known Problems Sister     No Known Problems Sister      Social History     Tobacco Use    Smoking status: Never Smoker    Smokeless tobacco: Never Used   Substance Use Topics    Alcohol use: Yes        Subjective:      Review of Systems   Constitutional: Negative for appetite change and fatigue. HENT: Negative for congestion and hearing loss. Eyes: Negative for visual disturbance. Respiratory: Negative for cough and shortness of breath. Cardiovascular: Negative for chest pain and palpitations. Gastrointestinal: Negative for abdominal distention, abdominal pain, constipation and diarrhea. Genitourinary: Negative for flank pain, frequency, menstrual problem, pelvic pain and vaginal discharge. Musculoskeletal: Negative for back pain. Neurological: Negative for syncope and headaches. Psychiatric/Behavioral: Negative for behavioral problems. Objective: There were no vitals taken for this visit. Physical Exam  Constitutional:       Appearance: She is well-developed. HENT:      Head: Normocephalic. Eyes:      Extraocular Movements: Extraocular movements intact.       Conjunctiva/sclera: Conjunctivae normal.   Neck: Thyroid: No thyromegaly. Trachea: No tracheal deviation. Pulmonary:      Effort: Pulmonary effort is normal. No respiratory distress. Chest:      Breasts: Breasts are symmetrical.         Right: No inverted nipple. Left: No inverted nipple, mass, nipple discharge, skin change or tenderness. Abdominal:      General: There is no distension. Palpations: Abdomen is soft. There is no mass. Tenderness: There is no abdominal tenderness. Genitourinary:     Labia:         Right: No rash or lesion. Left: No rash or lesion. Vagina: No vaginal discharge or tenderness. Cervix: No cervical motion tenderness, discharge or friability. Uterus: Not deviated, not enlarged and not fixed. Adnexa:         Right: No mass, tenderness or fullness. Left: No mass, tenderness or fullness. Musculoskeletal:         General: No tenderness. Normal range of motion. Cervical back: Normal range of motion. Skin:     General: Skin is warm and dry. Neurological:      General: No focal deficit present. Mental Status: She is alert and oriented to person, place, and time. Mental status is at baseline. Psychiatric:         Mood and Affect: Mood normal.         Behavior: Behavior normal.         Thought Content: Thought content normal.         Judgment: Judgment normal.     IUD strings visualized and appropriate length        Assessment:     1. Women's annual routine gynecological examination    2. Encounter for screening mammogram for breast cancer    3. IUD check up          Plan:   1. Pap collected. Discussed new pap smear guidelines. Desires re-pap in 1 year. 2. Screening mammogram discussed and advised yearly if within normal limits. 3. Calcium and Vitamin D dosing reviewed. 4. Colonoscopy screening reviewed. 5. Bone density testing reviewed.      Electronicallysigned by MARK Frost CNP on 7/14/2021

## 2021-07-16 LAB
HPV SAMPLE: NORMAL
HPV, GENOTYPE 16: NOT DETECTED
HPV, GENOTYPE 18: NOT DETECTED
HPV, HIGH RISK OTHER: NOT DETECTED
HPV, INTERPRETATION: NORMAL
SPECIMEN DESCRIPTION: NORMAL

## 2021-07-19 LAB — CYTOLOGY REPORT: NORMAL

## 2021-08-05 DIAGNOSIS — Z12.31 SCREENING MAMMOGRAM, ENCOUNTER FOR: ICD-10-CM

## 2022-01-05 ENCOUNTER — OFFICE VISIT (OUTPATIENT)
Dept: OBGYN CLINIC | Age: 57
End: 2022-01-05
Payer: COMMERCIAL

## 2022-01-05 VITALS
DIASTOLIC BLOOD PRESSURE: 100 MMHG | HEIGHT: 66 IN | WEIGHT: 231 LBS | SYSTOLIC BLOOD PRESSURE: 142 MMHG | BODY MASS INDEX: 37.12 KG/M2

## 2022-01-05 DIAGNOSIS — R32 URINARY INCONTINENCE, UNSPECIFIED TYPE: ICD-10-CM

## 2022-01-05 DIAGNOSIS — R30.0 BURNING WITH URINATION: ICD-10-CM

## 2022-01-05 DIAGNOSIS — N89.8 VAGINAL ITCHING: Primary | ICD-10-CM

## 2022-01-05 PROBLEM — N80.9 ENDOMETRIOSIS: Status: ACTIVE | Noted: 2022-01-05

## 2022-01-05 PROBLEM — I10 HYPERTENSION: Status: ACTIVE | Noted: 2022-01-05

## 2022-01-05 PROBLEM — M51.34 DEGENERATIVE DISC DISEASE, THORACIC: Status: ACTIVE | Noted: 2022-01-05

## 2022-01-05 PROBLEM — L43.8: Status: ACTIVE | Noted: 2019-11-01

## 2022-01-05 PROBLEM — B00.9 HERPES SIMPLEX VIRUS (HSV) INFECTION: Status: ACTIVE | Noted: 2022-01-05

## 2022-01-05 PROBLEM — M19.90 OSTEOARTHRITIS: Status: ACTIVE | Noted: 2022-01-05

## 2022-01-05 PROCEDURE — G8417 CALC BMI ABV UP PARAM F/U: HCPCS | Performed by: STUDENT IN AN ORGANIZED HEALTH CARE EDUCATION/TRAINING PROGRAM

## 2022-01-05 PROCEDURE — G8484 FLU IMMUNIZE NO ADMIN: HCPCS | Performed by: STUDENT IN AN ORGANIZED HEALTH CARE EDUCATION/TRAINING PROGRAM

## 2022-01-05 PROCEDURE — 3017F COLORECTAL CA SCREEN DOC REV: CPT | Performed by: STUDENT IN AN ORGANIZED HEALTH CARE EDUCATION/TRAINING PROGRAM

## 2022-01-05 PROCEDURE — G8427 DOCREV CUR MEDS BY ELIG CLIN: HCPCS | Performed by: STUDENT IN AN ORGANIZED HEALTH CARE EDUCATION/TRAINING PROGRAM

## 2022-01-05 PROCEDURE — 1036F TOBACCO NON-USER: CPT | Performed by: STUDENT IN AN ORGANIZED HEALTH CARE EDUCATION/TRAINING PROGRAM

## 2022-01-05 PROCEDURE — 99213 OFFICE O/P EST LOW 20 MIN: CPT | Performed by: STUDENT IN AN ORGANIZED HEALTH CARE EDUCATION/TRAINING PROGRAM

## 2022-01-05 RX ORDER — NITROFURANTOIN 25; 75 MG/1; MG/1
100 CAPSULE ORAL 2 TIMES DAILY
Qty: 10 CAPSULE | Refills: 0 | Status: SHIPPED | OUTPATIENT
Start: 2022-01-05 | End: 2022-01-10

## 2022-01-05 NOTE — PROGRESS NOTES
[facility]   [facility] [address]  [address]    Problem Visit      Desiree Morales  2022                       Primary Care Physician: Teo Lara M.D., MD    CC:   Chief Complaint   Patient presents with    Vaginal Itching     urinary issues         HPI: Desiree Morales is a 49 y.o. female  No LMP recorded. Patient has had an implant. The patient was seen and examined. She is here for burning with urination and is complaining of incomplete emptying. Patient admits that her symptoms started on Monday. She is also noticing some vaginal burning. She denies any vaginal discharge.       REVIEW OF SYSTEMS:  Constitutional: negative fever, negative chills  HEENT: negative visual disturbances, negative headaches  Respiratory: negative dyspnea, negative cough  Cardiovascular: negative chest pain,  negative palpitations  Gastrointestinal: negative abdominal pain, negative RUQ pain, negative N/V, negative diarrhea, negative constipation  Genitourinary: positive dysuria, negative vaginal discharge  Dermatological: negative rash  Hematologic: negative bruising  Immunologic/Lymphatic: negative recent illness, negative recent sick contact  Musculoskeletal: negative back pain, negative myalgias, negative arthralgias  Neurological:  negative dizziness, negative weakness  Behavior/Psych: negative depression, negative anxiety    OBSTETRICAL HISTORY:  OB History    Para Term  AB Living   2 2 1 1 0 2   SAB IAB Ectopic Molar Multiple Live Births   0 0 0   0 2      # Outcome Date GA Lbr Daryn/2nd Weight Sex Delivery Anes PTL Lv   2 Term 92 38w0d  10 lb (4.536 kg) M CS-LTranv  N TERESA      Complications: Status post repeat low transverse  section   1  89 32w0d  4 lb 3 oz (1.899 kg) M CS-LTranv   TERESA      Complications: Placenta abruptio       PAST MEDICAL HISTORY:      Diagnosis Date    Degenerative disc disease, thoracic     Endometriosis     Herpes simplex virus (HSV) infection     Hypertension     Nonerosive lichen planus of oral mucosa 2019    Osteoarthritis        PAST SURGICAL HISTORY:                                                                    Procedure Laterality Date     SECTION, LOW TRANSVERSE       SECTION, LOW TRANSVERSE      INTRAUTERINE DEVICE INSERTION  2013    Mirena    INTRAUTERINE DEVICE INSERTION  2019    Mirena     INTRAUTERINE DEVICE REMOVAL  2019    Ultrasound used     KNEE SURGERY Left 2021    LAPAROSCOPY  1996    endometriosis    SINUS SURGERY  2005       MEDICATIONS:  Current Outpatient Medications   Medication Sig Dispense Refill    nitrofurantoin, macrocrystal-monohydrate, (MACROBID) 100 MG capsule Take 1 capsule by mouth 2 times daily for 5 days 10 capsule 0    cyclobenzaprine (FLEXERIL) 5 MG tablet cyclobenzaprine 5 mg tablet      diclofenac (VOLTAREN) 75 MG EC tablet take 1 tablet by mouth twice a day      gabapentin (NEURONTIN) 100 MG capsule Take 200 mg by mouth nightly.       naproxen (NAPROSYN) 500 MG tablet naproxen 500 mg tablet      valACYclovir (VALTREX) 1 g tablet Take 1,000 mg by mouth 2 times daily as needed      Multiple Vitamin (MULTIVITAMIN PO) Take by mouth      triamcinolone (KENALOG) 0.1 % cream apply to affected area twice a day for 2 weeks PER MONTH      lisinopril-hydrochlorothiazide (PRINZIDE;ZESTORETIC) 10-12.5 MG per tablet take 1 tablet by mouth once daily  0    Fexofenadine HCl (ALLEGRA PO) Take 10 mg by mouth daily      Esomeprazole Magnesium (NEXIUM 24HR PO) Take by mouth       Current Facility-Administered Medications   Medication Dose Route Frequency Provider Last Rate Last Admin    levonorgestrel (MIRENA) IUD 52 mg 1 each  1 each IntraUTERine Continuous Trinda Betsy APRN - CNP   1 each at 19 1618       ALLERGIES:   Allergies as of 2022 - Fully Reviewed 2022   Allergen Reaction Noted    Imitrex [sumatriptan] Anaphylaxis 2019    Other  2021    Sulfa antibiotics  2021    Nickel Rash 2019                                   VITALS:  Vitals:    22 1520   BP: (!) 142/100   Site: Left Upper Arm   Position: Sitting   Cuff Size: Large Adult   Weight: 231 lb (104.8 kg)   Height: 5' 5.5\" (1.664 m)                                                                                                                                                                     PHYSICAL EXAM:   General Appearance: Appears healthy. Alert; in no acute distress. Pleasant. Skin: Skin color, texture, turgor normal. No rashes or lesions. HEENT: normocephalic and atraumatic, Thyroid normal to inspection and palpation  Respiratory: Normal expansion. Clear to auscultation. No rales, rhonchi, or wheezing. Cardiovascular: normal rate, normal S1 and S2, no gallops, intact distal pulses and no carotid bruits  Breast:  (Chest): N/A  Abdomen: soft, non-tender, non-distended, no right upper quadrant tenderness and no CVA tenderness  Pelvic Exam:   External genitalia: General appearance; normal, Hair distribution; normal, Lesions absent  Urinary system: urethral meatus normal  Vaginal: normal mucosa, no discharge  Cervix: normal appearing cervix without discharge or lesions  Rectal Exam: exam declined by patient  Musculoskeletal: no gross abnormalities  Extremities: non-tender BLE and non-edematous  Psych:  oriented to time, place and person       DATA:  No results found for this visit on 22. ASSESSMENT & PLAN:    Gianni Mckeon is a 49 y.o. female  No LMP recorded. Patient has had an implant.     Dysuria   - UCx, Vag probe and GC pending   - Will treat empirically with Macrobid and call if culture is negative    Patient Active Problem List    Diagnosis Date Noted    Degenerative disc disease, thoracic 2022    Endometriosis 2022    Herpes simplex virus (HSV) infection 2022    Hypertension 01/05/2022    Osteoarthritis 10/56/4675    Nonerosive lichen planus of oral mucosa 11/2019       Return if symptoms worsen or fail to improve. Counseling Completed:    Discussed need for repeat pap as per American Society for Colposcopy and Cervical Pathology guidelines. Discussed need for mammograms every 1 year, If >44 yo and last mammogram was negative. Discussed Calcium and Vitamin D dosing. Discussed need for colonoscopy screening as well as onset for bone density testing. Discussed birth control and barrier recommendations. Discussed STD counseling and prevention. Discussed Gardisil counseling for all patients 10-37 yo. Hereditary Breast, Ovarian, Colon and Uterine Cancer screening discussed. Tobacco & Secondary smoke risks discussed; with recommendation for cessation and avoidance. Routine health maintenance per patients PCP discussed. Patient was seen with total face to face time of 15 minutes. More than 50% of this visit was on counseling and education regarding her diagnose(s) as listed below and options. She was also counseled on her preventative health maintenance recommendations and follow-up. Diagnosis Orders   1. Vaginal itching  Culture, Urine    VAGINITIS DNA PROBE    C.trachomatis N.gonorrhoeae DNA   2. Urinary incontinence, unspecified type  Culture, Urine    VAGINITIS DNA PROBE    C.trachomatis N.gonorrhoeae DNA   3.  Burning with urination  Culture, Urine    VAGINITIS DNA PROBE    C.trachomatis N.gonorrhoeae DNA    nitrofurantoin, macrocrystal-monohydrate, (MACROBID) 100 MG capsule         Annabelle Harvey DO  [address] [facility]N, [facility]  1/5/2022, 3:46 PM

## 2022-01-06 ENCOUNTER — HOSPITAL ENCOUNTER (OUTPATIENT)
Age: 57
Setting detail: SPECIMEN
Discharge: HOME | End: 2022-01-06

## 2022-01-06 DIAGNOSIS — R32 URINARY INCONTINENCE, UNSPECIFIED TYPE: ICD-10-CM

## 2022-01-06 DIAGNOSIS — R30.0 BURNING WITH URINATION: ICD-10-CM

## 2022-01-06 DIAGNOSIS — N89.8 VAGINAL ITCHING: ICD-10-CM

## 2022-01-07 DIAGNOSIS — B96.89 BACTERIAL VAGINITIS: Primary | ICD-10-CM

## 2022-01-07 DIAGNOSIS — N76.0 BACTERIAL VAGINITIS: Primary | ICD-10-CM

## 2022-01-07 LAB
C TRACH DNA GENITAL QL NAA+PROBE: NEGATIVE
CANDIDA SPECIES, DNA PROBE: NEGATIVE
GARDNERELLA VAGINALIS, DNA PROBE: POSITIVE
N. GONORRHOEAE DNA: NEGATIVE
SOURCE: ABNORMAL
SPECIMEN DESCRIPTION: NORMAL
TRICHOMONAS VAGINALIS DNA: NEGATIVE

## 2022-01-07 RX ORDER — METRONIDAZOLE 500 MG/1
500 TABLET ORAL 2 TIMES DAILY
Qty: 14 TABLET | Refills: 0 | Status: SHIPPED | OUTPATIENT
Start: 2022-01-07 | End: 2022-01-14

## 2022-01-10 LAB
CULTURE: ABNORMAL
CULTURE: ABNORMAL
Lab: ABNORMAL
SPECIMEN DESCRIPTION: ABNORMAL

## 2022-07-14 NOTE — PROGRESS NOTES
[address] [facility] - [address] [facility] [facility] [address] [facility] [address]  Dept: [phone]           Patient: Lisa Coronel  Primary Care Physician: MARK Brito NP     HPI: Lisa Coronel is a 49 y.o. [id number] who presents today for her annual women's wellness exam. She is working on adding healthy habits to her routine. Has been riding her bike a lot lately. Teaches first grade. Has 2 children aged 35 and 27 as well as an 6year-old granddaughter. She reports she was having hot flashes- \"provitalize\" supplement has helped. Has IUD and wants to check labs to see if she is menopausal.    OBSTETRICAL & GYNECOLOGICAL HISTORY:  OB History    Para Term  AB Living   2 2 1 1 0 2   SAB IAB Ectopic Molar Multiple Live Births   0 0 0 0 0 2      # Outcome Date GA Lbr Daryn/2nd Weight Sex Delivery Anes PTL Lv   2 Term 92 38w0d  10 lb (4.536 kg) M CS-LTranv  N TERESA      Complications: Status post repeat low transverse  section   1  89 32w0d  4 lb 3 oz (1.899 kg) M CS-LTranv   TERESA      Complications: Placenta abruptio   Age of Menarche: 17  No LMP recorded. Patient has had an implant. Reports external labia discomfort from bike riding    Iud placed , wants to keep in until expiration  Taking Calcium/Vitamin D supplement - takes 500mg valcium plus d3    Sexually Active:  yes with   Dyspareunia: No  STD History: Yes hpv  Birth Control: IUD    FAMILY HISTORY:  Family History of Breast, Ovarian, Colon or Uterine Cancer: Yes paternal grandmother had colon cancer     family history includes Heart Attack (age of onset: 76) in her father; No Known Problems in her sister and sister; Prostate Cancer (age of onset: 79) in her father; Rheum Arthritis in her mother. SOCIAL HISTORY:    reports that she has never smoked. She has never used smokeless tobacco. She reports current alcohol use.  She reports that she does not use drugs. MEDICAL HISTORY:  is allergic to imitrex [sumatriptan], other, sulfa antibiotics, and nickel. CURRENT MEDS W/ ASSOC DIAG           Start Date End Date     cyclobenzaprine (FLEXERIL) 5 MG tablet  --  --     Associated Diagnoses:  --     diclofenac (VOLTAREN) 75 MG EC tablet  21  --     Associated Diagnoses:  --     Esomeprazole Magnesium (NEXIUM 24HR PO)  --  --     Associated Diagnoses:  --     Fexofenadine HCl (ALLEGRA PO)  --  --     Associated Diagnoses:  --     gabapentin (NEURONTIN) 100 MG capsule  20  --     Associated Diagnoses:  --     levonorgestrel (MIRENA) IUD 52 mg 1 each  19  --     1 each, Intrauterine, CONTINUOUS, Starting Mon 19 at 36     Associated Diagnoses:  Encounter for female birth control, Encounter for IUD insertion     lisinopril-hydrochlorothiazide (PRINZIDE;ZESTORETIC) 10-12.5 MG per tablet  18  --     Associated Diagnoses:  --     Multiple Vitamin (MULTIVITAMIN PO)  --  --     Associated Diagnoses:  --     naproxen (NAPROSYN) 500 MG tablet  --  --     Associated Diagnoses:  --     triamcinolone (KENALOG) 0.1 % cream  17  --     Associated Diagnoses:  --     valACYclovir (VALTREX) 1 g tablet  --  --     Associated Diagnoses:  --            has a past medical history of Degenerative disc disease, thoracic, Endometriosis, Herpes simplex virus (HSV) infection, Hypertension, Nonerosive lichen planus of oral mucosa, and Osteoarthritis. has a past surgical history that includes intrauterine device insertion (2013);  section, low transverse ();  section, low transverse (); laparoscopy (1996); sinus surgery (2005); Intrauterine Device Removal (2019); intrauterine device insertion (2019); and knee surgery (Left, 2021). HEALTH MAINTENANCE:  -Covid vaccine and booster recommended.  Annual flu vaccine recommended October-April.   -Discussed Gardisil counseling for all patients 9-45 yo.  -Shingles vaccine:  2 doses Shingrix recommended for adults >50y. o, Single dose Zostavax live vaccine recommended for adults >57 y.o.    -Pap Smear collected today  -Mammogram due: now, ordered today   Last mammogram approximate date 2021 and was normal  -Colonoscopy due: gets colonoscopies every 5 years due to family history, has had some polyps removed in the past    -DEXA due: recommended beginning at age 72                                                                                                                              REVIEW OF SYSTEMS:   Review of Systems   Constitutional:  Negative for chills, fatigue and fever. Genitourinary:  Negative for decreased urine volume, difficulty urinating, dyspareunia, dysuria, frequency, genital sores, hematuria, menstrual problem, pelvic pain, urgency, vaginal bleeding, vaginal discharge and vaginal pain. Skin:  Positive for wound (abrasian on her labia/pubic area from a long bike ride/uncomfortable bicycle seat per pt). All other systems reviewed and are negative. PHYSICAL EXAM:   Vitals:    07/15/22 1006   BP: 124/80   Position: Sitting   Cuff Size: Large Adult   Weight: 233 lb (105.7 kg)   Height: 5' 5.5\" (1.664 m)        Physical Exam  Constitutional:       General: She is awake. She is not in acute distress. Appearance: Normal appearance. She is well-developed and well-groomed. She is not ill-appearing, toxic-appearing or diaphoretic. Genitourinary:      Urethral meatus normal.      Right Labia: No rash, tenderness, lesions, skin changes or Bartholin's cyst.     Left Labia: No tenderness, lesions, skin changes, Bartholin's cyst or rash. No vaginal discharge or tenderness. No cervical motion tenderness, discharge or friability. Breasts:     Breasts are symmetrical.      Breasts are soft. Right: Present.  No swelling, bleeding, inverted nipple, mass, nipple discharge, skin change, tenderness, breast implant, axillary adenopathy or supraclavicular adenopathy. Left: Present. No swelling, bleeding, inverted nipple, mass, nipple discharge, skin change, tenderness, breast implant, axillary adenopathy or supraclavicular adenopathy. HENT:      Head: Normocephalic and atraumatic. Nose: Nose normal.   Eyes:      Extraocular Movements: Extraocular movements intact. Pulmonary:      Effort: Pulmonary effort is normal.   Musculoskeletal:         General: Normal range of motion. Cervical back: Normal range of motion. Lymphadenopathy:      Upper Body:      Right upper body: No supraclavicular or axillary adenopathy. Left upper body: No supraclavicular or axillary adenopathy. Neurological:      General: No focal deficit present. Mental Status: She is alert and oriented to person, place, and time. Mental status is at baseline. Psychiatric:         Attention and Perception: Attention and perception normal.         Mood and Affect: Mood normal.         Speech: Speech normal.         Behavior: Behavior normal. Behavior is cooperative. Thought Content: Thought content normal.         Cognition and Memory: Cognition and memory normal.         Judgment: Judgment normal.   Vitals reviewed. ASSESSMENT & PLAN:    Ari Resendez is a 49 y.o. female  here for her annual women's wellness exam. Today, we discussed    Diagnosis Orders   1. Encounter for gynecological examination  PAP SMEAR      2. Encounter for screening mammogram for breast cancer  BRANDON WILFRID DIGITAL SCREEN BILATERAL      3. Hot flashes  Luteinizing Hormone    Estradiol      4. Abrasion of labia, initial encounter           Advised keeping labial abrasion clean and dry, applying coconut oil to assist with healing. Notify office with any concerns for nonhealing abrasion/s/s of infection    No follow-ups on file.   Counseling Completed:  -Discussed recommendations to repeat pap as per American Society for Colposcopy and Cervical Pathology guidelines.  -Discussed need for mammograms every 1 year, If >42 yo and last mammogram was negative.  -Discussed Calcium and Vitamin D dosing.  -Discussed need for colonoscopy screening as well as onset for bone density testing.  -Discussed birth control and barrier recommendations. Discussed STD counseling and prevention.  -Hereditary Breast, Ovarian, Colon and Uterine Cancer screening discussed.          -Tobacco & Secondary smoke risks discussed; with recommendation for cessation and avoidance.  -Routine health maintenance per patients PCP discussed. Return to this office annually and PRN.     The patient was seen and evaluated face to face by Jennell Aase, APRN - CNP   7/15/2022, 5:11 PM

## 2022-07-15 ENCOUNTER — HOSPITAL ENCOUNTER (OUTPATIENT)
Age: 57
Setting detail: SPECIMEN
Discharge: HOME | End: 2022-07-15

## 2022-07-15 ENCOUNTER — OFFICE VISIT (OUTPATIENT)
Dept: OBGYN CLINIC | Age: 57
End: 2022-07-15
Payer: COMMERCIAL

## 2022-07-15 VITALS
DIASTOLIC BLOOD PRESSURE: 80 MMHG | SYSTOLIC BLOOD PRESSURE: 124 MMHG | WEIGHT: 233 LBS | BODY MASS INDEX: 37.45 KG/M2 | HEIGHT: 66 IN

## 2022-07-15 DIAGNOSIS — Z12.31 ENCOUNTER FOR SCREENING MAMMOGRAM FOR BREAST CANCER: ICD-10-CM

## 2022-07-15 DIAGNOSIS — Z01.419 ENCOUNTER FOR GYNECOLOGICAL EXAMINATION: Primary | ICD-10-CM

## 2022-07-15 DIAGNOSIS — Z01.419 WOMEN'S ANNUAL ROUTINE GYNECOLOGICAL EXAMINATION: ICD-10-CM

## 2022-07-15 DIAGNOSIS — R23.2 HOT FLASHES: ICD-10-CM

## 2022-07-15 DIAGNOSIS — S30.814A ABRASION OF LABIA, INITIAL ENCOUNTER: ICD-10-CM

## 2022-07-15 LAB
ESTRADIOL LEVEL: <5 PG/ML (ref 27–314)
LH: 35.5 MIU/ML (ref 1–95.6)

## 2022-07-15 PROCEDURE — 99396 PREV VISIT EST AGE 40-64: CPT

## 2022-07-22 LAB — CYTOLOGY REPORT: NORMAL

## 2023-08-25 ENCOUNTER — OFFICE VISIT (OUTPATIENT)
Dept: OBGYN CLINIC | Age: 58
End: 2023-08-25
Payer: COMMERCIAL

## 2023-08-25 ENCOUNTER — HOSPITAL ENCOUNTER (OUTPATIENT)
Age: 58
Setting detail: SPECIMEN
Discharge: HOME | End: 2023-08-25

## 2023-08-25 VITALS
WEIGHT: 210 LBS | BODY MASS INDEX: 33.75 KG/M2 | DIASTOLIC BLOOD PRESSURE: 84 MMHG | HEIGHT: 66 IN | SYSTOLIC BLOOD PRESSURE: 118 MMHG

## 2023-08-25 DIAGNOSIS — Z01.419 ENCOUNTER FOR GYNECOLOGICAL EXAMINATION: Primary | ICD-10-CM

## 2023-08-25 DIAGNOSIS — Z12.31 ENCOUNTER FOR SCREENING MAMMOGRAM FOR BREAST CANCER: ICD-10-CM

## 2023-08-25 DIAGNOSIS — Z30.431 IUD CHECK UP: ICD-10-CM

## 2023-08-25 PROCEDURE — 3078F DIAST BP <80 MM HG: CPT

## 2023-08-25 PROCEDURE — 99396 PREV VISIT EST AGE 40-64: CPT

## 2023-08-25 PROCEDURE — 3074F SYST BP LT 130 MM HG: CPT

## 2023-08-25 NOTE — PROGRESS NOTES
[address] [facility] - [address] [place] [address]  Dept: [phone]           Patient: Ivania Rutherford  Primary Care Physician: MARK Tolbert NP   Chief Complaint   Patient presents with    Annual Exam     Prev Pap: 7/15/22 WNL/HPV Neg; Prev Joshua: 21WNL     HPI: Ivania Rutherford is a 56 y.o. [id number] who presents today for her annual women's wellness exam. Has 2 adult children, works as a . For activity, she likes to ride stationary bike and rides outside as well. Has lost 28 lb with adipex. Mirena iud placed 2019    Joshua normal , has repeat joshua scheduled within the month    OBSTETRICAL & GYNECOLOGICAL HISTORY:  OB History    Para Term  AB Living   2 2 1 1 0 2   SAB IAB Ectopic Molar Multiple Live Births   0 0 0 0 0 2      # Outcome Date GA Lbr Daryn/2nd Weight Sex Delivery Anes PTL Lv   2 Term 92 38w0d  10 lb (4.536 kg) M CS-LTranv  N TERESA      Complications: Status post repeat low transverse  section   1  89 32w0d  4 lb 3 oz (1.899 kg) M CS-LTranv   TERESA      Complications: Placenta abruptio     Age of Menarche: 15   No bleeding whatsoever. Fsh and estrogen last year suggested menopause  takes calcium & vitamin d supplementation    No LMP recorded. Patient has had an implant. Sexually Active: with   Dyspareunia: No  STD History: Yes hpv  Birth Control: iud and wants to keep in place until expiration    History of abnormal pap smear/ Colposcopy/ LEEP procedure: no    FAMILY HISTORY:  Family History of Breast, Ovarian, Colon or Uterine Cancer: Yes      family history includes Colon Cancer in her paternal grandmother; Heart Attack (age of onset: 76) in her father; No Known Problems in her sister and sister; Prostate Cancer (age of onset: 79) in her father; Rheum Arthritis in her mother.     SOCIAL HISTORY:    reports that she has never

## 2023-08-29 LAB
HPV I/H RISK 4 DNA CVX QL NAA+PROBE: NOT DETECTED
HPV SAMPLE: NORMAL
HPV, INTERPRETATION: NORMAL
HPV16 DNA CVX QL NAA+PROBE: NOT DETECTED
HPV18 DNA CVX QL NAA+PROBE: NOT DETECTED
SPECIMEN DESCRIPTION: NORMAL

## 2023-08-30 LAB — CYTOLOGY REPORT: NORMAL

## 2025-01-23 ENCOUNTER — OFFICE VISIT (OUTPATIENT)
Dept: OBGYN CLINIC | Age: 60
End: 2025-01-23

## 2025-01-23 VITALS
BODY MASS INDEX: 35.68 KG/M2 | DIASTOLIC BLOOD PRESSURE: 82 MMHG | HEIGHT: 66 IN | SYSTOLIC BLOOD PRESSURE: 132 MMHG | HEART RATE: 78 BPM | WEIGHT: 222 LBS

## 2025-01-23 DIAGNOSIS — Z80.0 FAMILY HISTORY OF COLON CANCER: ICD-10-CM

## 2025-01-23 DIAGNOSIS — Z12.31 ENCOUNTER FOR SCREENING MAMMOGRAM FOR BREAST CANCER: ICD-10-CM

## 2025-01-23 DIAGNOSIS — Z30.432 ENCOUNTER FOR IUD REMOVAL: ICD-10-CM

## 2025-01-23 DIAGNOSIS — Z01.419 ENCOUNTER FOR GYNECOLOGICAL EXAMINATION: Primary | ICD-10-CM

## 2025-01-23 NOTE — PROGRESS NOTES
[facility]  [address]  [address]  [facility] [address]  Dept: [phone]           Annual gynecologic exam    Patient: Domonique Gagnon  Primary Care Physician: Luna Nunez APRN - NP   Chief Complaint   Patient presents with    Annual Exam     Prev Pap: 23 WNL/HPV Neg  Prev Joshua: 21 WNL     HPI: Domonique Gagnon is a 59 y.o.  who presents today as a returning patient for her annual women's wellness exam. She has 2 adult children. She works as a . For activity, she is trying to exercise regularly. Has an iud placed in , expires in . She Would like iud removed today. In  her estrogen level was 5 and Fsh 90. In  pap nilm hpv -  OBSTETRICAL & GYNECOLOGICAL HISTORY:  OB History    Para Term  AB Living   2 2 1 1 0 2   SAB IAB Ectopic Molar Multiple Live Births   0 0 0 0 0 2      # Outcome Date GA Lbr Daryn/2nd Weight Sex Type Anes PTL Lv   2 Term 92 38w0d  4.536 kg (10 lb) M CS-LTranv  N TERESA      Complications: Status post repeat low transverse  section   1  89 32w0d  1.899 kg (4 lb 3 oz) M CS-LTranv   TERESA      Complications: Placenta abruptio     No LMP recorded. Patient has had an implant.  She is postmenopausal and denies any vaginal bleeding since iud insertion    Sexually Active: with   Dyspareunia: No  history of abnormal pap smear/ Colposcopy/ LEEP procedure: denies any abnormals in 25 + years - no colposcopy ever     FAMILY HISTORY:  Family History of Breast, Ovarian, Colon or Uterine Cancer: Yes      family history includes Colon Cancer in her paternal grandmother; Heart Attack (age of onset: 68) in her father; No Known Problems in her sister and sister; Prostate Cancer (age of onset: 67) in her father; Rheum Arthritis in her mother.    SOCIAL HISTORY:    reports that she has never smoked. She has never used smokeless tobacco.

## 2025-01-24 ENCOUNTER — TELEPHONE (OUTPATIENT)
Dept: GASTROENTEROLOGY | Age: 60
End: 2025-01-24

## 2025-01-24 NOTE — TELEPHONE ENCOUNTER
GI visit Héctor  Family hx of colon cancer  Attempt 1- unable to leave message.  The voicemail is not set up.  Attempt 2- sent letter